# Patient Record
Sex: FEMALE | URBAN - METROPOLITAN AREA
[De-identification: names, ages, dates, MRNs, and addresses within clinical notes are randomized per-mention and may not be internally consistent; named-entity substitution may affect disease eponyms.]

---

## 2024-08-09 ENCOUNTER — TRANSCRIBE ORDERS (OUTPATIENT)
Dept: PHYSICAL THERAPY | Facility: CLINIC | Age: 39
End: 2024-08-09

## 2024-08-09 DIAGNOSIS — R42 DIZZINESS: Primary | ICD-10-CM

## 2024-09-05 ENCOUNTER — HOSPITAL ENCOUNTER (OUTPATIENT)
Dept: PHYSICAL THERAPY | Facility: HOSPITAL | Age: 39
Discharge: HOME | End: 2024-09-05
Payer: COMMERCIAL

## 2024-09-05 DIAGNOSIS — R42 DIZZINESS: Primary | ICD-10-CM

## 2024-09-05 PROCEDURE — 97162 PT EVAL MOD COMPLEX 30 MIN: CPT | Performed by: PHYSICAL THERAPIST

## 2024-09-05 NOTE — THERAPY EVALUATION
Physical Therapy Initial Evaluation and Plan of Care     [facility] [place]          [address]     Patient: Long Hansen   : 1985  MRN: [id number]   Diagnosis/ICD-10 Code:      ICD-10-CM ICD-9-CM   1. Dizziness  R42 780.4      Referring practitioner: Jeanine Spears*  Today's Date: 2024      Subjective:     Subjective Questionnaire: DHI: 64      Subjective Evaluation    History of Present Illness  Mechanism of injury: Patient states she felt lightheaded around the . She had a moment where she felt like she was falling and her phone was moving in the opposite direction. This happened again and she went to the ER. CT was clean. She followed up with neurologist and dx'd with vertigo and nausea. She also felt weak and shaky. MRI was normal.        Objective     PT Neuro     24 1500   Symptom Behavior   Type of Dizziness Spinning;World moves;Funny feeling in head   Frequency of Dizziness Several Times a Day   Duration of Dizziness Seconds   VAS Intensity (0-10) 5   Aggravating Factors Spontaneous onset   Relieving Factors Rest;Closing eyes   Occulomotor Exam Fixation Present   Occular ROM Normal   Spontaneous Nystagmus Absent   Gaze-induced Nystagmus Absent   Smooth Pursuit Normal  (slightly saccadic in r diagonal direction)   Saccades Right:;Undershoots   Convergence Normal   Vestibulo-Occular Reflex (VOR)   VOR 1 Head Only normalized   Positional Testing   [place]irae-Hallpike Right No nystagmus   [place]-Hallpike Left No nystagmus   Horizontal Roll Test Right No nystagmus   Horizontal Roll Test Left No nystagmus   AROM Cervical   AROM Cervical WFL          Assessment & Plan       Assessment  Impairments: lacks appropriate home exercise program   Functional limitations: moving in bed and stooping   Assessment details: Patient is a 39 YOF that presents with onset of vertigo/dizziness that began in July. She has been to the ER and seen ENT and neurology. Each  speciality gave her a different diagnosis, Meniere's and vestibular migraines. She has lowered her sodium, added nasal spray and claratin, along with taking 2.5mg prednisone. Her symptoms are better but she is still concerned since they were so severe originally. Pt's exam today was unremarkable. All testing should have been provocative if she was experiencing a hypofunction related to migraines. She is seeking a second opinion regarding the Meniere's diagnosis. At this time, her symptoms did sound peripheral in nature, however it is hard to know since she is on a steroid. She was asked to stop performing any type of vestibular exercise and remove herself from the steroid. She will call back Monday to alert me of her symptoms and if a follow up appointment should be made.   Prognosis: fair    Goals  Plan Goals: SHORT TERM GOALS: To be met in 2 weeks:  1. Patient is independent with HEP.  2. Patient will report at least 30% improvement in overall condition.  LONG TERM GOALS:To be met in 4 weeks:  1. Patient will report decreased disequilibrium/dizziness by at least 90%.  2. Patient will report no loss of balance with ADLs to demonstrate improved functional balance.  3. Patient denies dizziness with daily activity.  4. DHI score is less than 10.     Plan  Therapy options: will be seen for skilled therapy services  Planned therapy interventions: balance/weight-bearing training, home exercise program and neuromuscular re-education  Frequency: 1x week  Duration in visits: 4  Plan details: Patient will be seen 1x/wk for 4 visits with treatment to include canalith repositioning, neuromuscular re-education, balance and gait training, along with therapeutic exercise as indicated.         Timed:  Manual Therapy:    0     mins  80214;  Therapeutic Exercise:    0     mins  86251;     Neuromuscular Ary:    0    mins  39692;    Therapeutic Activity:     0     mins  55876;     Gait Trainin     mins  59907;     Electrical  Stimulation:    0     mins  06664 ( );    Untimed:  Canalith Repositioning    0     mins 85640    Timed Treatment:   0   mins   Total Treatment:     45   mins    PT SIGNATURE: Pratibha Casey, PT, DPT, MSCS, CSRS  [place] License #[id number]  DATE TREATMENT INITIATED: 9/5/2024      Initial Certification Certification Period: 9/5/20246671lrnn63/3/2024  I certify that the therapy services are furnished while this patient is under my care.  The services outlined above are required by this patient, and will be reviewed every 90 days.     PHYSICIAN: Jeanine Klein APRN  NPI: [id number]                                         DATE:     Please sign and return via fax to [phone].   Thank you,   [facility].

## 2024-09-06 PROCEDURE — 97162 PT EVAL MOD COMPLEX 30 MIN: CPT | Performed by: PHYSICAL THERAPIST

## 2024-09-13 ENCOUNTER — HOSPITAL ENCOUNTER (OUTPATIENT)
Dept: PHYSICAL THERAPY | Facility: HOSPITAL | Age: 39
Setting detail: THERAPIES SERIES
Discharge: HOME | End: 2024-09-13
Payer: COMMERCIAL

## 2024-09-13 DIAGNOSIS — R42 DIZZINESS: Primary | ICD-10-CM

## 2024-09-13 PROCEDURE — 97110 THERAPEUTIC EXERCISES: CPT | Performed by: PHYSICAL THERAPIST

## 2024-09-13 PROCEDURE — 97112 NEUROMUSCULAR REEDUCATION: CPT | Performed by: PHYSICAL THERAPIST

## 2024-09-13 NOTE — THERAPY TREATMENT NOTE
Physical Therapy Daily Note      Patient: Long Hansen   : 1985  MRN: [id number]   Diagnosis/ICD-10 Code:      ICD-10-CM ICD-9-CM   1. Dizziness  R42 780.4      Referring practitioner: Jeanine Spears*  Today's Date: 2024    Subjective:   Patient reports: she discontinued exercises and steroid. She is having symptoms again.   Pain: 0/10  Treatment has included: therapeutic exercise and neuromuscular re-education    Objective   See Exercise, Manual, and Modality Logs for complete treatment.    PT Neuro   Exercise 1  Exercise Name 1: ale sahni for habituation  Exercise 2  Exercise Name 2: discussion of symptoms and habituation exercise plan  Time 2: 20 min       24 1600   Occulomotor Exam Fixation Present   Occular ROM Normal   Smooth Pursuit Normal   Positional Testing   Desirae-Hallpike Right No nystagmus   [place]-Hallpike Left No nystagmus   Horizontal Roll Test Right No nystagmus   Horizontal Roll Test Left No nystagmus          Assessment & Plan       Assessment  Assessment details: Patient reports to clinic after discontinuing habituation exercises and steroid. She had a few days of feeling fine but then looking down began causing a feeling of falling. All peripheral ear testing was negative. 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 She will re-initiate habituation program with the addition of semont to assess improvement of systems. She does have an ENT referral and I have recommended that she discuss with her provider about ENG/VNG testing.     Plan  Plan details: Follow up as needed.         Timed:  Manual Therapy:            0     mins  64651;  Therapeutic Exercise:    25    mins  07780;     Neuromuscular Ary:    13    mins  54715;    Therapeutic Activity:      0     mins  93979;     Gait Trainin    mins  92683;     Electrical Stimulation:    0    mins  37433 (  );     Untimed:  Canalith Repositioning techniques _0_ [id number]      Timed Treatment:   38   mins   Total Treatment:     38   mins      Pratibha Casey PT, DPT, MSCS, CSRS  [place] License #: [id number]  Physical Therapist